# Patient Record
Sex: FEMALE | Race: WHITE
[De-identification: names, ages, dates, MRNs, and addresses within clinical notes are randomized per-mention and may not be internally consistent; named-entity substitution may affect disease eponyms.]

---

## 2018-11-27 ENCOUNTER — HOSPITAL ENCOUNTER (OUTPATIENT)
Dept: HOSPITAL 62 - WI | Age: 38
End: 2018-11-27
Attending: PHYSICIAN ASSISTANT
Payer: COMMERCIAL

## 2018-11-27 DIAGNOSIS — N60.01: ICD-10-CM

## 2018-11-27 DIAGNOSIS — N63.42: ICD-10-CM

## 2018-11-27 DIAGNOSIS — N64.4: Primary | ICD-10-CM

## 2018-11-27 PROCEDURE — 77066 DX MAMMO INCL CAD BI: CPT

## 2018-11-27 PROCEDURE — 76641 ULTRASOUND BREAST COMPLETE: CPT

## 2018-11-27 NOTE — WOMENS IMAGING REPORT
EXAM DESCRIPTION:  BILAT DIAGNOSTIC MAMMO W/CAD; U/S BREAST UNILATERAL, COMPL



COMPLETED DATE/TIME:  11/27/2018 9:47 am; 11/27/2018 10:37 am



REASON FOR STUDY:  BILATERAL DIAGNOSTIC MAMMO N64.4; RT BREAST PAIN N64.4; LEFT BREAST PAIN N64.4 N64
.4  MASTODYNIA bilateral breast pain



COMPARISON:  None.



TECHNIQUE:  Standard craniocaudal and mediolateral oblique views of each breast recorded using digita
l acquisition and breast tomosynthesis.  Additional "push-back" craniocaudal and mediolateral oblique
 images acquired.

Bilateral whole breast ultrasound was also performed.



LIMITATIONS:  None.



FINDINGS:  IMPLANTS: Bilateral subglandular implants.

RIGHT BREAST

MASSES: No suspicious masses.

CALCIFICATIONS: No new or suspicious calcifications.

ARCHITECTURAL DISTORTION: None.

DEVELOPING DENSITY: None.

ASYMMETRY: None noted.

OTHER: No other significant findings.

LEFT BREAST

MASSES: No suspicious masses.

CALCIFICATIONS: No new or suspicious calcifications.

ARCHITECTURAL DISTORTION: None.

DEVELOPING DENSITY: None.

ASYMMETRY: None noted.

OTHER: No other significant finding.

Read with the assistance of CAD:

.[facility] - R2 Cenova Version 1.3

.[facility] - R2 Cenova Version 1.3

.[facility] - R2 Cenova Version 2.4

.[facility] [place] R2 Cenova Version 2.4

.[facility] - R2  Version 9.2

Bilateral whole breast ultrasound was performed.

On the right side, the breast implant is intact.  There is a 3 mm cyst in the immediate right retroar
eolar region.  Benign 7 x 5 mm intramammary lymph node at the 6 o'clock position.  No worrisome solid
 masses or acoustic absorption.

On the left side, the breast implant is intact.  In the 6 o'clock position retroareolar region, a 4 m
m and 5 mm well-circumscribed hypoechoic solid nodules are present, either small papillomas or fibroa
denomas.  Ultrasound-guided core biopsy of these nodules is recommended.



IMPRESSION:  No mammographic/ tomosynthesis or ultrasound evidence for malignancy right breast.

Left breast ultrasound demonstrates a 4 mm and 5 mm well-circumscribed hypoechoic nodules in the 6 o'
clock position left breast.  These could represent papillomas, fibroadenomas, or cysts with debris.  
Ultrasound-guided core biopsy with post biopsy clip placement in these lesions is recommended



BREAST DENSITY:  b. There are scattered areas of fibroglandular density.



BIRAD:  4 Suspicious. Biopsy should be considered.



RECOMMENDATION:  RECOMMENDED FOLLOW UP: Ultrasound-guided core biopsy left breast 6 o'clock position,
 with post biopsy clip placement and follow-up two-view mammogram

SPECIFIC INTERVENTION/IMAGING/CONSULTATION RECOMMENDED:No additional intervention/ imaging/consultati
on needed at this time.

COMMUNICATION:Results were not discussed with the patient at the time of service.  Patient notified b
y letter.



COMMENT:  The patient has been notified of the results by letter per SA requirements. Additional no
tification policies are in place for contacting patient with suspicious or incomplete findings.

Quality ID #225: The American College of Radiology recommends an annual screening mammogram for women
 aged 40 years or over. This facility utilizes a reminder system to ensure that all patients receive 
reminder letters, and/or direct phone calls for appointments. This includes reminders for routine scr
eening mammograms, diagnostic mammograms, or other Breast Imaging Interventions when appropriate.  Th
is patient will be placed in the appropriate reminder system.

The American College of Radiology (ACR) has developed recommendations for screening MRI of the breast
s in certain patient populations, to be used in conjunction with mammography.  Breast MRI surveillanc
e may be appropriate for women with more than 20% lifetime risk of developing breast cancer  as deter
mined by genetic testing, significant family history of the disease, or history of mantle radiation f
or Hodgkins Disease.  ACR Practice Guidelines 2008.

[employer]



PQRS 6045F: Fluoroscopic imaging is not utilized for breast tomosynthesis.



TECHNICAL DOCUMENTATION:  FINDING NUMBER: (1)

ASSESSMENT: (1)

JOB ID:  [id number]

 2011 [employer]- All Rights Reserved



Reading location - IP/workstation name: [email or web address] [id number]

## 2018-11-27 NOTE — WOMENS IMAGING REPORT
EXAM DESCRIPTION:  BILAT DIAGNOSTIC MAMMO W/CAD; U/S BREAST UNILATERAL, COMPL



COMPLETED DATE/TIME:  11/27/2018 9:47 am; 11/27/2018 10:37 am



REASON FOR STUDY:  BILATERAL DIAGNOSTIC MAMMO N64.4; RT BREAST PAIN N64.4; LEFT BREAST PAIN N64.4 N64
.4  MASTODYNIA bilateral breast pain



COMPARISON:  None.



TECHNIQUE:  Standard craniocaudal and mediolateral oblique views of each breast recorded using digita
l acquisition and breast tomosynthesis.  Additional "push-back" craniocaudal and mediolateral oblique
 images acquired.

Bilateral whole breast ultrasound was also performed.



LIMITATIONS:  None.



FINDINGS:  IMPLANTS: Bilateral subglandular implants.

RIGHT BREAST

MASSES: No suspicious masses.

CALCIFICATIONS: No new or suspicious calcifications.

ARCHITECTURAL DISTORTION: None.

DEVELOPING DENSITY: None.

ASYMMETRY: None noted.

OTHER: No other significant findings.

LEFT BREAST

MASSES: No suspicious masses.

CALCIFICATIONS: No new or suspicious calcifications.

ARCHITECTURAL DISTORTION: None.

DEVELOPING DENSITY: None.

ASYMMETRY: None noted.

OTHER: No other significant finding.

Read with the assistance of CAD:

.[facility] - R2 Cenova Version 1.3

.[facility] - R2 Cenova Version 1.3

.[facility] - R2 Cenova Version 2.4

.[facility] [place] R2 Cenova Version 2.4

.[facility] - R2  Version 9.2

Bilateral whole breast ultrasound was performed.

On the right side, the breast implant is intact.  There is a 3 mm cyst in the immediate right retroar
eolar region.  Benign 7 x 5 mm intramammary lymph node at the 6 o'clock position.  No worrisome solid
 masses or acoustic absorption.

On the left side, the breast implant is intact.  In the 6 o'clock position retroareolar region, a 4 m
m and 5 mm well-circumscribed hypoechoic solid nodules are present, either small papillomas or fibroa
denomas.  Ultrasound-guided core biopsy of these nodules is recommended.



IMPRESSION:  No mammographic/ tomosynthesis or ultrasound evidence for malignancy right breast.

Left breast ultrasound demonstrates a 4 mm and 5 mm well-circumscribed hypoechoic nodules in the 6 o'
clock position left breast.  These could represent papillomas, fibroadenomas, or cysts with debris.  
Ultrasound-guided core biopsy with post biopsy clip placement in these lesions is recommended



BREAST DENSITY:  b. There are scattered areas of fibroglandular density.



BIRAD:  4 Suspicious. Biopsy should be considered.



RECOMMENDATION:  RECOMMENDED FOLLOW UP: Ultrasound-guided core biopsy left breast 6 o'clock position,
 with post biopsy clip placement and follow-up two-view mammogram

SPECIFIC INTERVENTION/IMAGING/CONSULTATION RECOMMENDED:No additional intervention/ imaging/consultati
on needed at this time.

COMMUNICATION:Results were not discussed with the patient at the time of service.  Patient notified b
y letter.



COMMENT:  The patient has been notified of the results by letter per SA requirements. Additional no
tification policies are in place for contacting patient with suspicious or incomplete findings.

Quality ID #225: The American College of Radiology recommends an annual screening mammogram for women
 aged 40 years or over. This facility utilizes a reminder system to ensure that all patients receive 
reminder letters, and/or direct phone calls for appointments. This includes reminders for routine scr
eening mammograms, diagnostic mammograms, or other Breast Imaging Interventions when appropriate.  Th
is patient will be placed in the appropriate reminder system.

The American College of Radiology (ACR) has developed recommendations for screening MRI of the breast
s in certain patient populations, to be used in conjunction with mammography.  Breast MRI surveillanc
e may be appropriate for women with more than 20% lifetime risk of developing breast cancer  as deter
mined by genetic testing, significant family history of the disease, or history of mantle radiation f
or Hodgkins Disease.  ACR Practice Guidelines 2008.

[employer]



PQRS 6045F: Fluoroscopic imaging is not utilized for breast tomosynthesis.



TECHNICAL DOCUMENTATION:  FINDING NUMBER: (1)

ASSESSMENT: (1)

JOB ID:  [id number]

 2011 [employer] All Rights Reserved



Reading location - IP/workstation name: [place] [id number]

## 2018-11-27 NOTE — WOMENS IMAGING REPORT
EXAM DESCRIPTION:  BILAT DIAGNOSTIC MAMMO W/CAD; U/S BREAST UNILATERAL, COMPL



COMPLETED DATE/TIME:  11/27/2018 9:47 am; 11/27/2018 10:37 am



REASON FOR STUDY:  BILATERAL DIAGNOSTIC MAMMO N64.4; RT BREAST PAIN N64.4; LEFT BREAST PAIN N64.4 N64
.4  MASTODYNIA bilateral breast pain



COMPARISON:  None.



TECHNIQUE:  Standard craniocaudal and mediolateral oblique views of each breast recorded using digita
l acquisition and breast tomosynthesis.  Additional "push-back" craniocaudal and mediolateral oblique
 images acquired.

Bilateral whole breast ultrasound was also performed.



LIMITATIONS:  None.



FINDINGS:  IMPLANTS: Bilateral subglandular implants.

RIGHT BREAST

MASSES: No suspicious masses.

CALCIFICATIONS: No new or suspicious calcifications.

ARCHITECTURAL DISTORTION: None.

DEVELOPING DENSITY: None.

ASYMMETRY: None noted.

OTHER: No other significant findings.

LEFT BREAST

MASSES: No suspicious masses.

CALCIFICATIONS: No new or suspicious calcifications.

ARCHITECTURAL DISTORTION: None.

DEVELOPING DENSITY: None.

ASYMMETRY: None noted.

OTHER: No other significant finding.

Read with the assistance of CAD:

.[facility] - R2 Cenova Version 1.3

.[facility] - R2 Cenova Version 1.3

.[facility] - R2 Cenova Version 2.4

.[facility] - R2 Cenova Version 2.4

.[facility] - R2  Version 9.2

Bilateral whole breast ultrasound was performed.

On the right side, the breast implant is intact.  There is a 3 mm cyst in the immediate right retroar
eolar region.  Benign 7 x 5 mm intramammary lymph node at the 6 o'clock position.  No worrisome solid
 masses or acoustic absorption.

On the left side, the breast implant is intact.  In the 6 o'clock position retroareolar region, a 4 m
m and 5 mm well-circumscribed hypoechoic solid nodules are present, either small papillomas or fibroa
denomas.  Ultrasound-guided core biopsy of these nodules is recommended.



IMPRESSION:  No mammographic/ tomosynthesis or ultrasound evidence for malignancy right breast.

Left breast ultrasound demonstrates a 4 mm and 5 mm well-circumscribed hypoechoic nodules in the 6 o'
clock position left breast.  These could represent papillomas, fibroadenomas, or cysts with debris.  
Ultrasound-guided core biopsy with post biopsy clip placement in these lesions is recommended



BREAST DENSITY:  b. There are scattered areas of fibroglandular density.



BIRAD:  4 Suspicious. Biopsy should be considered.



RECOMMENDATION:  RECOMMENDED FOLLOW UP: Ultrasound-guided core biopsy left breast 6 o'clock position,
 with post biopsy clip placement and follow-up two-view mammogram

SPECIFIC INTERVENTION/IMAGING/CONSULTATION RECOMMENDED:No additional intervention/ imaging/consultati
on needed at this time.

COMMUNICATION:Results were not discussed with the patient at the time of service.  Patient notified b
y letter.



COMMENT:  The patient has been notified of the results by letter per SA requirements. Additional no
tification policies are in place for contacting patient with suspicious or incomplete findings.

Quality ID #225: The American College of Radiology recommends an annual screening mammogram for women
 aged 40 years or over. This facility utilizes a reminder system to ensure that all patients receive 
reminder letters, and/or direct phone calls for appointments. This includes reminders for routine scr
eening mammograms, diagnostic mammograms, or other Breast Imaging Interventions when appropriate.  Th
is patient will be placed in the appropriate reminder system.

The American College of Radiology (ACR) has developed recommendations for screening MRI of the breast
s in certain patient populations, to be used in conjunction with mammography.  Breast MRI surveillanc
e may be appropriate for women with more than 20% lifetime risk of developing breast cancer  as deter
mined by genetic testing, significant family history of the disease, or history of mantle radiation f
or Hodgkins Disease.  ACR Practice Guidelines 2008.

[employer]



PQRS 6045F: Fluoroscopic imaging is not utilized for breast tomosynthesis.



TECHNICAL DOCUMENTATION:  FINDING NUMBER: (1)

ASSESSMENT: (1)

JOB ID:  [id number]

 2011 [employer]- All Rights Reserved



Reading location - IP/workstation name: [place] [id number]

## 2018-12-07 ENCOUNTER — HOSPITAL ENCOUNTER (OUTPATIENT)
Dept: HOSPITAL 62 - RAD | Age: 38
End: 2018-12-07
Attending: PHYSICIAN ASSISTANT
Payer: COMMERCIAL

## 2018-12-07 DIAGNOSIS — N60.12: ICD-10-CM

## 2018-12-07 DIAGNOSIS — N63.20: Primary | ICD-10-CM

## 2018-12-07 DIAGNOSIS — N64.4: ICD-10-CM

## 2018-12-07 PROCEDURE — 19083 BX BREAST 1ST LESION US IMAG: CPT

## 2018-12-07 PROCEDURE — 88342 IMHCHEM/IMCYTCHM 1ST ANTB: CPT

## 2018-12-07 PROCEDURE — 88305 TISSUE EXAM BY PATHOLOGIST: CPT

## 2018-12-10 NOTE — WOMENS IMAGING REPORT
EXAM DESCRIPTION:  LEFT DIG DX MAMMO NO CHG; U/S BREAST BX



COMPLETED DATE/TIME:  12/7/2018 11:25 am; 12/7/2018 12:27 pm



REASON FOR STUDY:  N63.20 S/P LEFT BREAST BX FOR CLIP PLACEMENT; BREAST CORE BIOPSY/ N63.20 / LEFT BR
EAST LUMP N63.20  UNSPECIFIED LUMP IN THE LEFT BREAST, UNSPECIFIED QUAD N63.0  UNSPECIFIED LUMP IN UN
SPECIFIED BREAST N64.4  MASTODYNIA



COMPARISON:  Mammograms 11/27/2018, left breast ultrasound 11/27/2018



TECHNIQUE:  The procedure was discussed with the patient and the patient agreed to proceed.

The patient was scanned and the area of interest in the 6 o'clock position periareolar region of the 
left breast was localized.  This correlates with the area of concern on prior imaging studies. This a
sheri was targeted for ultrasound-guided core biopsy.

After sterile skin prep and 3 mL local lidocaine 1% for skin and deep tissue anesthesia, a 14 gauge c
oaxial core biopsy needle was used to obtain several cores of tissue from the lesion.  Both hypoechoi
c complex cystic structures disappeared after the 2nd biopsy pass.  Under ultrasound guidance, a ribb
on clip was placed in the areas sampled.  There were no immediate post-procedure complications.

MAMMOGRAM: Post-procedure two view mammogram was acquired in the digital mammogram suite. The clip wa
s in the expected location. No significant hematoma.

Pathology yields a diagnosis of fibrocystic changes, negative for atypia or malignancy

Pathology is concordant.



LIMITATIONS:  None.



FINDINGS:  Ultrasound guided breast biopsy as described above.

POST PROCEDURE MAMMOGRAMS FOR MARKER PLACEMENT: Yes



IMPRESSION:  ULTRASOUND-GUIDED CORE BIOPSY OF THE RIGHT BREAST YIELDS A DIAGNOSIS OF benign fibrocyst
ic changes.  Lesions in question disappeared after the 2nd biopsy pass, likely from cyst rupture.

BI-RADS 2, benign findings

Patient should return to yearly bilateral screening in November 2019



COMMENT:  COMMUNICATION: Results were discussed with the patient, 1530 hours 12/10/2018.  She underst
ands this is a benign biopsy, and that she can return to bilateral screening mammography/ tomosynthes
is in November 2019

Patient medication list reviewed: Yes- Quality ID# [id number]:Eligible professional attests to documenting i
n the medical record they obtained, updated, or reviewed the patient's current medications.



TECHNICAL DOCUMENTATION:  JOB ID:  [id number]

 "[employer]- All Rights Reserved



Reading location - IP/workstation name: [email or web address]

## 2018-12-10 NOTE — WOMENS IMAGING REPORT
EXAM DESCRIPTION:  LEFT DIG DX MAMMO NO CHG; U/S BREAST BX



COMPLETED DATE/TIME:  12/7/2018 11:25 am; 12/7/2018 12:27 pm



REASON FOR STUDY:  N63.20 S/P LEFT BREAST BX FOR CLIP PLACEMENT; BREAST CORE BIOPSY/ N63.20 / LEFT BR
EAST LUMP N63.20  UNSPECIFIED LUMP IN THE LEFT BREAST, UNSPECIFIED QUAD N63.0  UNSPECIFIED LUMP IN UN
SPECIFIED BREAST N64.4  MASTODYNIA



COMPARISON:  Mammograms 11/27/2018, left breast ultrasound 11/27/2018



TECHNIQUE:  The procedure was discussed with the patient and the patient agreed to proceed.

The patient was scanned and the area of interest in the 6 o'clock position periareolar region of the 
left breast was localized.  This correlates with the area of concern on prior imaging studies. This a
sheri was targeted for ultrasound-guided core biopsy.

After sterile skin prep and 3 mL local lidocaine 1% for skin and deep tissue anesthesia, a 14 gauge c
oaxial core biopsy needle was used to obtain several cores of tissue from the lesion.  Both hypoechoi
c complex cystic structures disappeared after the 2nd biopsy pass.  Under ultrasound guidance, a ribb
on clip was placed in the areas sampled.  There were no immediate post-procedure complications.

MAMMOGRAM: Post-procedure two view mammogram was acquired in the digital mammogram suite. The clip wa
s in the expected location. No significant hematoma.

Pathology yields a diagnosis of fibrocystic changes, negative for atypia or malignancy

Pathology is concordant.



LIMITATIONS:  None.



FINDINGS:  Ultrasound guided breast biopsy as described above.

POST PROCEDURE MAMMOGRAMS FOR MARKER PLACEMENT: Yes



IMPRESSION:  ULTRASOUND-GUIDED CORE BIOPSY OF THE RIGHT BREAST YIELDS A DIAGNOSIS OF benign fibrocyst
ic changes.  Lesions in question disappeared after the 2nd biopsy pass, likely from cyst rupture.

BI-RADS 2, benign findings

Patient should return to yearly bilateral screening in November 2019



COMMENT:  COMMUNICATION: Results were discussed with the patient, 1530 hours 12/10/2018.  She underst
ands this is a benign biopsy, and that she can return to bilateral screening mammography/ tomosynthes
is in November 2019

Patient medication list reviewed: Yes- Quality ID# [id number]:Eligible professional attests to documenting i
n the medical record they obtained, updated, or reviewed the patient's current medications.



TECHNICAL DOCUMENTATION:  JOB ID:  [id number]

 [employer]- All Rights Reserved



Reading location - IP/workstation name: [email or web address]